# Patient Record
Sex: FEMALE | Race: ASIAN
[De-identification: names, ages, dates, MRNs, and addresses within clinical notes are randomized per-mention and may not be internally consistent; named-entity substitution may affect disease eponyms.]

---

## 2021-03-30 ENCOUNTER — RESULT REVIEW (OUTPATIENT)
Age: 36
End: 2021-03-30

## 2021-04-27 ENCOUNTER — RESULT REVIEW (OUTPATIENT)
Age: 36
End: 2021-04-27

## 2022-01-19 ENCOUNTER — RESULT REVIEW (OUTPATIENT)
Age: 37
End: 2022-01-19

## 2022-09-06 ENCOUNTER — TRANSCRIPTION ENCOUNTER (OUTPATIENT)
Age: 37
End: 2022-09-06

## 2022-09-06 ENCOUNTER — APPOINTMENT (OUTPATIENT)
Dept: ORTHOPEDIC SURGERY | Facility: CLINIC | Age: 37
End: 2022-09-06

## 2022-09-06 VITALS — WEIGHT: 112 LBS | HEIGHT: 60 IN | BODY MASS INDEX: 21.99 KG/M2

## 2022-09-06 PROBLEM — Z00.00 ENCOUNTER FOR PREVENTIVE HEALTH EXAMINATION: Status: ACTIVE | Noted: 2022-09-06

## 2022-09-06 PROCEDURE — 29515 APPLICATION SHORT LEG SPLINT: CPT | Mod: RT

## 2022-09-06 PROCEDURE — 99203 OFFICE O/P NEW LOW 30 MIN: CPT | Mod: 25

## 2022-09-06 NOTE — HISTORY OF PRESENT ILLNESS
[de-identified] : Location: Right ankle - medial\par Duration: 9/3/22\par Context: jumped and landed awkwardly on right foot\par Quality: dull\par Aggravating factors: walking, ROM\par Associated symptoms: swelling, bruising\par Conservative treatment: rest, ice, splint, crutches\par Prior studies: X-ray 9/4/22 [place]\par Patient is present with her significant other for history and exam\par

## 2022-09-06 NOTE — ASSESSMENT
[FreeTextEntry1] : Discussed at length with patient exam history and imaging and given the severity of the displaced fracture recommendation for open reduction internal fixation.  Risks and benefits were detailed at length as well as timing at this point patient would like to consider her options with the understanding that most likely she will proceed with surgery but states she wanted time to process the conversation and injury.  She is to call if she would like to see with surgery with recommendations to proceed sooner than later

## 2022-09-06 NOTE — PHYSICAL EXAM
[de-identified] : Right ankle\par \par Constitutional: \par The patient is healthy-appearing and in no apparent distress. \par \par Gait and Station: \par The patient ambulates with a limp and crutches. \par \par Cardiovascular System: \par The capillary refill is less than 2 seconds. \par \par Skin: \par There are no skin abnormalities of ankle.\par \par Ankles and Feet: \par Inspection: \par There is no erythema.\par There is no induration.\par There is no warmth.\par There is no deformity.  \par There is diffuse mild ankle swelling and medial ankle/dorsal foot bruising. \par \par Bony Palpation: \par There is no tenderness of the calcaneal tuberosity.\par There is no tenderness of the metatarsals.\par There is no tenderness of the tarsometatarsal joints\par There is no tenderness of the navicular tuberosity. \par There is tenderness of the medial malleolus.\par There is no tenderness of the dome of talus.\par There is no tenderness of the head of talus.\par There is no tenderness of the inferior tibiofibular joint.\par \par Soft Tissue Palpation: \par There is no tenderness of the tibialis posterior.\par There is no tenderness of the tibialis anterior. \par There is no tenderness of the plantar fascia.\par There is no tenderness of the Achilles tendon.\par There is no tenderness of the extensor hallucis longus.\par There is no tenderness of the sinus tarsi. \par There is no tenderness of the peroneus longus and brevis.\par There is no tenderness of the deltoid ligament.   \par There is no tenderness of the anterior talofibular ligament and the calcaneofibular ligament. \par \par Active Range of Motion: \par The range of motion at the ankle is PF to 30 and DF to -10 limited seoncdray to pain. \par \par Strength: \par Intact ankle plantarflexion and dorsiflexion.\par \par Neurological System: \par There is normal sensation to light touch at the ankle and foot. \par \par Psychiatric: \par The patient demonstrates a normal mood and affect and is active and alert. [de-identified] : X-ray right ankle.  There is a displaced medial malleolus fracture\par \par

## 2022-09-06 NOTE — PROCEDURE
[de-identified] : Patient placed into a we'll well-padded posterior mold with the U fiberglass strut and 20° of plantarflexion.  She was neurologically intact after

## 2022-09-08 DIAGNOSIS — S82.51XA DISPLACED FRACTURE OF MEDIAL MALLEOLUS OF RIGHT TIBIA, INITIAL ENCOUNTER FOR CLOSED FRACTURE: ICD-10-CM
